# Patient Record
Sex: FEMALE | Race: WHITE | URBAN - METROPOLITAN AREA
[De-identification: names, ages, dates, MRNs, and addresses within clinical notes are randomized per-mention and may not be internally consistent; named-entity substitution may affect disease eponyms.]

---

## 2022-05-11 ENCOUNTER — HOSPITAL ENCOUNTER (OUTPATIENT)
Age: 47
Setting detail: SPECIMEN
Discharge: HOME | End: 2022-05-11

## 2022-05-11 ENCOUNTER — OFFICE VISIT (OUTPATIENT)
Dept: OBGYN CLINIC | Age: 47
End: 2022-05-11
Payer: COMMERCIAL

## 2022-05-11 VITALS
WEIGHT: 211.4 LBS | SYSTOLIC BLOOD PRESSURE: 110 MMHG | HEIGHT: 64 IN | BODY MASS INDEX: 36.09 KG/M2 | DIASTOLIC BLOOD PRESSURE: 75 MMHG

## 2022-05-11 DIAGNOSIS — Z01.419 WOMEN'S ANNUAL ROUTINE GYNECOLOGICAL EXAMINATION: Primary | ICD-10-CM

## 2022-05-11 DIAGNOSIS — Z80.3 FAMILY HISTORY OF BREAST CANCER: ICD-10-CM

## 2022-05-11 PROCEDURE — 99396 PREV VISIT EST AGE 40-64: CPT | Performed by: NURSE PRACTITIONER

## 2022-05-11 RX ORDER — LEVONORGESTREL 52 MG/1
1 INTRAUTERINE DEVICE INTRAUTERINE ONCE
COMMUNITY

## 2022-05-11 RX ORDER — ESCITALOPRAM OXALATE 5 MG/1
TABLET ORAL
COMMUNITY
Start: 2022-04-27

## 2022-05-11 ASSESSMENT — ENCOUNTER SYMPTOMS
ABDOMINAL PAIN: 0
SHORTNESS OF BREATH: 0
CONSTIPATION: 0
DIARRHEA: 0

## 2022-05-11 NOTE — PROGRESS NOTES
YEARLY PHYSICAL    Date of service: 2022    Lynn Arango [address] [place] [address]  Is a 52 y.o.  female    PT's PCP is: No primary care provider on file. : 1975                                         Chaperone for Intimate Exam   Chaperone was offered as part of the rooming process. Patient declined and agrees to continue with exam without a chaperone.  Chaperone: n//a      Subjective:       No LMP recorded. (Menstrual status: Irregular periods). Are your menses regular: irregular menses with IUD    OB History    Para Term  AB Living   1       1     SAB IAB Ectopic Molar Multiple Live Births       1            # Outcome Date GA Lbr Twin/2nd Weight Sex Delivery Anes PTL Lv   1 Ectopic                 Social History     Tobacco Use   Smoking Status Never Smoker   Smokeless Tobacco Never Used        Social History     Substance and Sexual Activity   Alcohol Use Yes       Family History   Problem Relation Age of Onset    Lung Cancer Maternal Grandfather     Breast Cancer Mother 62    Cancer Mother         skin    Uterine Cancer Mother     Depression Sister     Breast Cancer Sister 40       Any family history of breast or ovarian cancer: Yes    Any family history of blood clots: No      Allergies: Patient has no known allergies. Current Outpatient Medications:     levonorgestrel (LILETTA, 52 MG,) 20.1 MCG/DAY IUD IUD 52 mg, 1 each by IntraUTERine route once, Disp: , Rfl:     metFORMIN (GLUCOPHAGE) 500 MG tablet, , Disp: , Rfl:     escitalopram (LEXAPRO) 5 MG tablet, , Disp: , Rfl:     Social History     Substance and Sexual Activity   Sexual Activity Yes    Partners: Male    Birth control/protection: I.U.D.     Comment: Fatmata Putnam- 4/3/20       Any bleeding or pain with intercourse: No    Last pap: 2019    Last HPV: 2019    Last Mammogram:     Do you do self breast exams: encouraged     Past Medical History:   Diagnosis Date    Abnormal Pap smear of cervix unsure    Anxiety     Insulin resistance     Panic attacks        Past Surgical History:   Procedure Laterality Date    TONSILLECTOMY         Family History   Problem Relation Age of Onset    Lung Cancer Maternal Grandfather     Breast Cancer Mother 62    Cancer Mother         skin    Uterine Cancer Mother     Depression Sister     Breast Cancer Sister 40       Chief Complaint   Patient presents with    Annual Exam     Last pap 3/19/19. Mammogram scheduled in June. Discuss BRCA testing. Voices no concerns. PE:  Vital Signs  Blood pressure 110/75, height 5' 4\" (1.626 m), weight 211 lb 6.4 oz (95.9 kg). Estimated body mass index is 36.29 kg/m² as calculated from the following:    Height as of this encounter: 5' 4\" (1.626 m). Weight as of this encounter: 211 lb 6.4 oz (95.9 kg). HPI: Patient presents today for annual exam. Feeling well, voices no concerns. Denies breast/pelvic pain. Due for pap. Mammogram scheduled. Wellness reviewed. Irregular spotting since Liletta insert. Review of Systems   Constitutional: Negative for chills, fatigue and fever. Respiratory: Negative for shortness of breath. Cardiovascular: Negative for chest pain. Gastrointestinal: Negative for abdominal pain, constipation and diarrhea. Genitourinary: Negative for dysuria, enuresis, frequency, menstrual problem, pelvic pain, urgency and vaginal bleeding. Neurological: Negative for dizziness, light-headedness and headaches. Physical Exam  Constitutional:       General: She is not in acute distress. Appearance: Normal appearance. She is not ill-appearing. Genitourinary:      Vulva normal.      No vaginal discharge. Right Adnexa: not tender. Left Adnexa: not tender. Adnexa exam comments: Difficult to assess due to body habitus . Uterus is not tender.    Breasts:      Right: No mass, nipple discharge, skin change or tenderness. Left: No mass, nipple discharge, skin change or tenderness. HENT:      Head: Normocephalic. Cardiovascular:      Rate and Rhythm: Normal rate and regular rhythm. Pulmonary:      Effort: Pulmonary effort is normal.      Breath sounds: Normal breath sounds. Abdominal:      Palpations: Abdomen is soft. Tenderness: There is no abdominal tenderness. There is no guarding or rebound. Musculoskeletal:         General: Normal range of motion. Neurological:      General: No focal deficit present. Mental Status: She is alert. Psychiatric:         Mood and Affect: Mood normal.         Behavior: Behavior normal.                           Assessment and Plan          Diagnosis Orders   1. Women's annual routine gynecological examination  PAP SMEAR   2. Family history of breast cancer  BRCA1 and BRCA2       Repeat Annual every 1 year  Cervical Cytology Evaluation begins at 24years old. If Negative Cytology, Follow-up screening per current guidelines. Mammograms every 1year. If 37 yo and last mammogram was negative. Routine healthmaintenance per patients PCP. discussed brca testing. Patient desires due to multiple first degree relatives with hx of breast cancer. Will start PA      I am having Mitesh Goldman maintain her Liletta (46 MG), metFORMIN, and escitalopram.    Return in about 1 year (around 5/11/2023) for yearly. She was also counseled on her preventative health maintenance recommendations and follow-up. There are no Patient Instructions on file for this visit.     LEONEL Lantigua NP,5/11/2022 9:28 AM

## 2022-05-13 LAB
HPV SAMPLE: NORMAL
HPV, GENOTYPE 16: NOT DETECTED
HPV, GENOTYPE 18: NOT DETECTED
HPV, HIGH RISK OTHER: NOT DETECTED
HPV, INTERPRETATION: NORMAL
SPECIMEN DESCRIPTION: NORMAL

## 2022-05-17 LAB — CYTOLOGY REPORT: NORMAL

## 2022-06-13 ENCOUNTER — TELEPHONE (OUTPATIENT)
Dept: OBGYN CLINIC | Age: 47
End: 2022-06-13

## 2022-06-13 NOTE — TELEPHONE ENCOUNTER
Patient left a message on the voicemail that she saw Debra Bowden recently and was told that a PA would be started with her insurance for BRCA testing. She had not heard back, so was calling back to follow up. I do not see a note in the computer about this. Can you please follow up with patient.

## 2022-06-20 DIAGNOSIS — Z80.3 FAMILY HISTORY OF BREAST CANCER: Primary | ICD-10-CM

## 2022-06-20 NOTE — TELEPHONE ENCOUNTER
I never received a note or anything to PA BRCA testing. Would you like me to go ahead and get that started?

## 2022-07-15 NOTE — PROGRESS NOTES
3 [facility]   Hereditary Cancer Risk Assessment     Name: Zulma Duncan   YOB: 1975   Date of Consultation: 7/18/22    Ms. Scooter Cat was seen at the [facility] for genetic counseling on 7/18/22. Ms. Scooter Cat was referred by ABRIL West due to her family history of cancer. PERSONAL HISTORY   Ms. Scooter Cat is a 52 y.o.  female with no personal history of cancer. She reports:  33.9-38%    Menarche at age: 6  First child at age: NA, nulliparous   Menopause at age: NA, premenopausal   Hysterectomy: NA  Oophorectomy: NA  Last mammogram: 6/2022, TC=34-38%  Last breast MRI: NA  Breast biopsy: NA  Last colonoscopy: NA    FAMILY HISTORY  Ms. Scooter Cat has no biological children. She has one full sister (36y) who was diagnosed with breast cancer at age 43. She underwent lumpectomy and radiation therapy. She did not undergo genetic testing. Ms. Tari Mccallum's mother is living at age 68 and had a history of breast cancer at age 62 and later skin and uterine cancers. Ms. Scooter Cat also has a maternal uncle with prostate cancer at age 72 and an uncle with bladder cancer at age 76. There are several distant maternal relatives with cancer including lung, skin and prostate. Ms. Tari Mccallum'smaternal grandmother had cervical cancer at age 80. Ms. Tari Mccallum's father is living at age 76, no cancer. There are no known cancers in her extended paternal family. Ms. Scooter Cat reports [place], [place] and [place] ancestry and denies any known Ashkenazi Sabianism heritage. RISK ASSESSMENT   We discussed that approximately 5-10% of cancers are due to a hereditary gene mutation which causes an increased risk for certain cancers. Hereditary cancers are typically diagnosed at younger ages (under age 46y) and occur in multiple generations of a family. Multiple individuals with the same type of cancer (example: breast or colorectal) or uncommon cancers (example: ovarian, pancreatic, male breast cancer) are also features of hereditary cancers. In summary, Ms. Raul Stokes meets the [address] [place] [address] (NCCN) guidelines for genetic testing of the BRCA1/2 genes due to having a first degree relative with breast cancer under age 39. Her sister and mother are not available to undergo genetic testing; thus, Ms. Raul Stokes is an appropriate candidate for genetic testing. The NCCN guidelines also recognize that an individual's personal and/or family history may be explained by more than one inherited cancer syndrome. Thus, a multi-gene panel may be more efficient, more cost effecting, and increases the yield of detecting a hereditary mutation which would impact medical management. Given her personal and/or family history, we recommend testing for the following genes at minimum: JEANNETTE, CHEK2, and PALB2. DISCUSSION  We discussed that the BRCA1/2 genes are the most common genes associated with hereditary breast, ovarian, prostate and pancreatic cancer. We also discussed that genetic testing is available for multiple other genes related to hereditary cancer. Some of these genes are known to carry a significant increased risk for several cancers including colon, breast, uterine, ovarian, stomach, and pancreatic cancer, while some of these genes are believed to have a moderate increased risk for breast and other cancers. We discussed the possibility of finding a mutation in genes with limited information to guide medical management, as well we as the possibility of identifying variants of uncertain significance (VUS). We discussed the risks, benefits, and limitations of genetic testing. Possible test results were discussed as well as potential screening and prevention strategies.  Specifically, we discussed:    1) Increased breast cancer surveillance by mammogram and breast MRI as well as the option of prophylactic mastectomy  2) Possible recommendations for prophylactic oophorectomy for results which suggest an increased risk for ovarian cancer  3) Possible recommendations for prophylactic hysterectomy for results which suggest an increased risk for endometrial cancer  4) Increased colon cancer surveillance by colonoscopy screening every 1-2 years beginning by age 18-19y    Lastly, we discussed that the results of Ms. Mika Mccallum's genetic testing may be beneficial in defining her risk for cancer as well as for her family members. SUMMARY & PLAN  1) Ms. Corby Heart meets the NCCN criteria for genetic testing based on her family history of breast cancer including a first degree relative with breast cancer under age 39.     2) Genetic testing via a multi-gene panel was recommended and offered to Ms. Corby Heart. 3) Ms. Corby Heart elected to proceed with the CancerNext Expanded + RNA [employer] Hereditary Cancer Gene Panel. 4) Ms. Corby Heart is aware that she will receive a notification from the laboratory [facility]) if the out of pocket cost for testing exceeds $100 (based on individual insurance plan) and the option to proceed with the self pay price of $249.     5) Informed consent was obtained and a blood sample was sent to [facility]. We will call Ms. Corby Heart with results as soon as they are available. A follow up appointment may be recommended. A summary letter with results and final medical management recommendations will be sent once available. A total of 35 minutes were spent face to face with Ms. Corby Heart and 50% of the time was spent educating and counseling. The [facility] would be glad to offer our assistance should you have any questions or concerns about this information.  Please feel free to contact us at [phone]. Ty Damon.  Nellie Edward MS, [facility]   Licensed Genetic Counselor

## 2022-07-18 ENCOUNTER — OFFICE VISIT (OUTPATIENT)
Dept: ONCOLOGY | Age: 47
End: 2022-07-18
Payer: COMMERCIAL

## 2022-07-18 DIAGNOSIS — Z80.3 FAMILY HISTORY OF BREAST CANCER: Primary | ICD-10-CM

## 2022-07-18 PROCEDURE — 96040 PR GENETIC COUNSELING, EACH 30 MIN: CPT | Performed by: GENETIC COUNSELOR, MS

## 2022-08-09 ENCOUNTER — TELEPHONE (OUTPATIENT)
Dept: ONCOLOGY | Age: 47
End: 2022-08-09

## 2022-08-09 NOTE — TELEPHONE ENCOUNTER
Left message for Timothy Phan notifying her that her genetic test results are available. Requested that she return my phone call at her earliest convenience to review results.

## 2022-08-23 ENCOUNTER — TELEPHONE (OUTPATIENT)
Dept: OBGYN CLINIC | Age: 47
End: 2022-08-23

## 2022-08-23 NOTE — TELEPHONE ENCOUNTER
Patient left a message on the voicemail regarding her genetic testing. (There is a phone note regarding conversation in her chart). She would like to see a surgeon and/or oncologist to further discuss her options. The genetic counselor told her that she needed to follow up with our office, but she is questioning if she needs an appointment or if referral can be placed without an appointment?

## 2022-08-29 DIAGNOSIS — Z80.3 FAMILY HISTORY OF BREAST CANCER: Primary | ICD-10-CM

## 2022-08-29 NOTE — TELEPHONE ENCOUNTER
Pt called back and would like a referral to [facility] in [place], [place]. She would like a call back once the referral has been sent.

## 2022-10-06 ENCOUNTER — TELEPHONE (OUTPATIENT)
Dept: OBGYN CLINIC | Age: 47
End: 2022-10-06

## 2022-10-06 NOTE — TELEPHONE ENCOUNTER
Patient called stating that she has a consult with the surgical team at [facility] later this month and has some questions regarding the paperwork that she needs to complete. The paperwork is questioning dates of testing and previous medication. Answered patient's questions, nothing further needed at this time. She will call Mahendra Herring to get a copy of her images to take with her. Patient verbalized understanding.

## 2023-04-16 NOTE — TELEPHONE ENCOUNTER
3 [facility]   Hereditary Cancer Risk Assessment     Name: Hanna Lawrence  YOB: 1975  Date of Results Disclosure: 08/09/22     HISTORY   Ms. Jeanmarie Roque was seen for genetic counseling at the request of ABRIL Sharif due to her family history of cancer. At that time, Ms. Jeanmarie Roque chose to pursue genetic testing via the CancerNext Expanded + RNA gene panel. These results were discussed with Ms. Jeanmarie Roque via telephone. A summary of Ms. Mika Mccallum's results and recommendations are below. RESULTS  [employer] CancerNext-Expanded Panel + RNAinsight: NEGATIVE - NO CLINICALLY SIGNIFICANT MUTATIONS DETECTED   This panel included the analysis of 77 genes associated with hereditary cancer including: AIP, ALK, APC, JEANNETTE, BAP1, BARD1, BLM, BMPR1A, BRCA1, BRCA2, BRIP1, CDC73, CDH1, CDK4, CDKN1B, CDKN2A, CHEK2, CTNNA1, DICER1, EGFR, EGLN1, EPCAM, FANCC, FH, FLCN, GALNT12, GREM1, HOXB13, KIF1B, KIT1, LZTR1, MAX, MEN1, MET, MITF, MLH1, MSH2, MSH3, MSH6, MUTYH, NBN, NF1, NF2, NTHL1, PALB2, PDGFRA, PHOX2B, PMS2, POLD1, POLE, POT1, EMYDI5U, PTCH1, PTEN, RAD51C, RAD51D, RB1, RECQL, RET, SDHA, SDHAF2, SDHB, SDHC, ,SDHD, SMAD4, SMARCA4, SMARCB1, SMARCE1, STK11, SUFU, CESE438, TP53, TSC1, TSC2, VHL, and XRCC2. In addition, no clinically relevant aberrant RNA transcripts were detected in select genes. Please refer to genetic test report for technical details. Additional findings: VARIANT OF UNCERTAIN SIGNIFICANCE - MUTYH p.[id number]   A variant of uncertain significance (VUS) occurs when the laboratory does not have enough data to determine whether the genetic variant is benign (not associated with cancer) or pathogenic (associated with cancer). Because the significance of the MUTYH gene VUS is unknown, medical management must be based on Ms. Mika Mccallum's personal history and family history of cancer.      We discussed that Ms. Mika Mccallum's negative test result greatly reduces the likelihood that she carries a hereditary gene mutation. However, it is possible that her family history of cancer is due to a hereditary mutation which she did not inherit. It is also possible that her family history of cancer may be due to a gene for which testing was not performed or which has yet to be discovered. RECOMMENDATIONS  1) While Ms. Beatris Mendoza does not carry a known hereditary gene mutation, her risk for breast cancer may still be elevated due to her remaining family history of breast cancer. Based on Ms. Mika Mccallum's personal risk factors and family history of breast cancer, her estimated lifetime risk for breast cancer is 34-38% according to the [employer] risk model. The [address] [place] [address] (NCCN) recommends that women with a lifetime risk of breast cancer 20% or higher consider the following screening and risk reducing options:     NCCN Recommendation Age to Begin Frequency    Breast awareness - Women should be familiar with their breasts and promptly report changes to their healthcare provider. Periodic, consistent breast self-examination (BSE) may be beneficial  Individualized  N/A    Clinical Breast Examination  Individualized Every 6-12 months   Breast MRI with contrast  36years old  Annual   Mammogram (consider tomosynthesis)  36years old  Annual    Consider risk reducing agents (i.e. Tamoxifen)  Individualized  N/A    *age to begin screening is based on the onset of breast cancer in Ms. Mika Mccallum's family    2) Ms. Beatris Mendoza should continue general population cancer screening guidelines as directed by her physicians. RECOMMENDATIONS FOR FAMILY MEMBERS   1) Genetic testing is not recommended for Ms. Mika Mccallum's children based on her negative test results.  However, this recommendation does not take into consideration any family history of cancer in their paternal family. 2) It is possible that the cancers in Ms. Mika Mccallum's family are due to a hereditary gene mutation that she did not inherit. Therefore, her relatives (particularly those with a current or previous cancer diagnosis) may consider genetic counseling and testing to clarify this possibility. Relatives may contact the [address] [place] [address] [facility] at [phone] or locate a genetic counselor at www. [employer].     3) We encourage Ms. Mika Mccallum's relatives to discuss their family history of cancer with their physicians to determine the most appropriate cancer screening recommendations. Ms. Soledad Su female relatives may benefit from a formal breast cancer risk assessment by the Isanti Ely or Elder Tracey risk models to determine if additional breast cancer screening is warranted. SUMMARY & PLAN   1) Ms. Kajal Brown genetic test results are negative meaning there were no clinically significant mutations detected in the 77 genes analyzed. 2) A variant of uncertain significance was detected in the MUTYH gene(s). We will contact Ms. Addison Savage when there is additional information available regarding the classification of the genetic variant(s). 3) Ms. Soledad Su lifetime risk for breast cancer is 34-38% according to the Elder Tracey risk model. She may consider the NCCN guidelines outlined above for breast cancer surveillance. This includes annual breast MRI screening staggered 6 months apart from annual mammograms. She would be due for a breast MRI in December 2022. She may also consider referral to a breast specialist or medical oncologist to discuss other risk reducing options. She plans to discuss these recommendations with her referring provider, since she lives in [facility] she may consider to do these things closer to home. 4) There are no other changes in medical management for Ms. Addison Savage based on her negative genetic test results. 5) We encourage Ms. Dana Washington to contact us every 1-2 years to determine if there are any new genetic testing or research options available. 6) We encourage Ms. Dana Washington to contact us with updates to her personal and/or familys cancer history as this information may alter our assessment and/or recommendations. The [address] [place] [address] would be glad to offer our assistance should you have any questions or concerns about this information. Please feel free to contact us at [phone]. Osorio Confer.  Jun Hassan, MS, [facility]   Licensed Genetic Counselor         CC:  Ms. Janis Contreras, APRN-CNP Home

## 2023-05-16 ENCOUNTER — HOSPITAL ENCOUNTER (OUTPATIENT)
Age: 48
Setting detail: SPECIMEN
Discharge: HOME | End: 2023-05-16

## 2023-05-16 ENCOUNTER — OFFICE VISIT (OUTPATIENT)
Dept: OBGYN CLINIC | Age: 48
End: 2023-05-16

## 2023-05-16 VITALS
SYSTOLIC BLOOD PRESSURE: 121 MMHG | WEIGHT: 218 LBS | HEIGHT: 64 IN | DIASTOLIC BLOOD PRESSURE: 81 MMHG | BODY MASS INDEX: 37.22 KG/M2

## 2023-05-16 DIAGNOSIS — Z30.431 ENCOUNTER FOR ROUTINE CHECKING OF INTRAUTERINE CONTRACEPTIVE DEVICE (IUD): ICD-10-CM

## 2023-05-16 DIAGNOSIS — Z01.419 WOMEN'S ANNUAL ROUTINE GYNECOLOGICAL EXAMINATION: Primary | ICD-10-CM

## 2023-05-16 NOTE — PROGRESS NOTES
YEARLY PHYSICAL    Date of service: 2023    Rodrigo Acosta  Is a 50 y.o. female    PT's PCP is: No primary care provider on file. : 1975                                         Chaperone for Intimate Exam  Chaperone was offered as part of the rooming process. Patient declined and agrees to continue with exam without a chaperone. Chaperone: n/a      Subjective:       No LMP recorded (lmp unknown). (Menstrual status: IUD). Are your menses regular: no    OB History    Para Term  AB Living   1       1     SAB IAB Ectopic Molar Multiple Live Births       1            # Outcome Date GA Lbr Twin/2nd Weight Sex Delivery Anes PTL Lv   1 Ectopic                 Social History     Tobacco Use   Smoking Status Never   Smokeless Tobacco Never        Social History     Substance and Sexual Activity   Alcohol Use Yes       Family History   Problem Relation Age of Onset    Lung Cancer Maternal Grandfather     Breast Cancer Mother 62    Cancer Mother         skin    Uterine Cancer Mother     Depression Sister     Breast Cancer Sister 40    Breast Cancer Maternal Cousin     Cancer Maternal Uncle        Any family history of breast or ovarian cancer: Yes    Any family history of blood clots: No      Allergies: Patient has no known allergies. Current Outpatient Medications:     levonorgestrel (LILETTA, 52 MG,) 20.1 MCG/DAY IUD IUD 52 mg, 1 each by IntraUTERine route once, Disp: , Rfl:     metFORMIN (GLUCOPHAGE) 500 MG tablet, , Disp: , Rfl:     escitalopram (LEXAPRO) 5 MG tablet, , Disp: , Rfl:     Social History     Substance and Sexual Activity   Sexual Activity Yes    Partners: Male    Birth control/protection: I.U.D.     Comment: Lars Beltran- 4/3/20       Any bleeding or pain with intercourse: No    Last Yearly:  22    Last pap: 22-neg    Last HPV: 22-neg    Last Mammogram: 22    Last Bernardino López

## 2023-05-20 ASSESSMENT — ENCOUNTER SYMPTOMS
DIARRHEA: 0
ABDOMINAL PAIN: 0
SHORTNESS OF BREATH: 0
CONSTIPATION: 0

## 2023-05-23 LAB — CYTOLOGY REPORT: NORMAL

## 2023-06-06 ENCOUNTER — TELEPHONE (OUTPATIENT)
Dept: OBGYN CLINIC | Age: 48
End: 2023-06-06

## 2023-06-06 NOTE — TELEPHONE ENCOUNTER
Patient called as she has not heard back about her pap results from last month. Reviewed with patient that her pap was normal and HPV was negative. She also states that she was supposed to have a referral for a colonoscopy and has not heard from the other office yet. It does not appear that a referral was ever started. Referral to [address] [place] [address] for a colonoscopy. Patient is aware that their office will reach out to schedule. Patient verbalized understanding, no further questions/concerns voiced.